# Patient Record
Sex: MALE | Race: WHITE
[De-identification: names, ages, dates, MRNs, and addresses within clinical notes are randomized per-mention and may not be internally consistent; named-entity substitution may affect disease eponyms.]

---

## 2021-05-03 ENCOUNTER — HOSPITAL ENCOUNTER (EMERGENCY)
Dept: HOSPITAL 43 - DL.ED | Age: 36
Discharge: HOME | End: 2021-05-03
Payer: COMMERCIAL

## 2021-05-03 VITALS — SYSTOLIC BLOOD PRESSURE: 121 MMHG | HEART RATE: 91 BPM | DIASTOLIC BLOOD PRESSURE: 59 MMHG

## 2021-05-03 DIAGNOSIS — S61.220A: Primary | ICD-10-CM

## 2021-05-03 DIAGNOSIS — Z23: ICD-10-CM
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
## 2021-05-03 NOTE — EDM.PDOC
ED HPI GENERAL MEDICAL PROBLEM





- General


Chief Complaint: Laceration


Stated Complaint: RIGHT POINTER FINGER


Time Seen by Provider: 21 21:20


Source of Information: Reports: Patient


History Limitations: Reports: No Limitations





- History of Present Illness


INITIAL COMMENTS - FREE TEXT/NARRATIVE: 





This 36 yo male patient reports to the ED due to a right 2nd finger laceration. 

The patient reports he was fishing and fell on the rocks. The patient reports 

his finger was "bent backwards" after the fall. The patient reports he 

straightened the finger, but could not fix the laceration. 


Onset: Today


Duration: Minutes:


Location: Reports: Upper Extremity, Right


Quality: Reports: Ache, Dull


Severity: Moderate


Improves with: Reports: None


Worsens with: Reports: None


Context: Reports: Activity


Associated Symptoms: Reports: No Other Symptoms


  ** Right Finger-Index


Pain Score (Numeric/FACES): 2





- Related Data


                                    Allergies











Allergy/AdvReac Type Severity Reaction Status Date / Time


 


No Known Allergies Allergy   Verified 21 21:30











Home Meds: 


                                    Home Meds





LORazepam 1 mg PO Q8HR PRN #20 tablet 09/18/15 [Rx]


Sertraline HCl 100 mg PO DAILY 21 [History]











Past Medical History


Other Respiratory History: Reports seasonal allergies





ED ROS GENERAL





- Review of Systems


Review Of Systems: Comprehensive ROS is negative, except as noted in HPI.





ED EXAM, SKIN/RASH


Exam: See Below


Exam Limited By: No Limitations


General Appearance: Alert, WD/WN, Mild Distress


Eye Exam: Bilateral Eye: EOMI, Normal Inspection, PERRL


Ears: Normal External Exam, Hearing Grossly Normal


Nose: Normal Inspection, No Blood


Throat/Mouth: Normal Lips, Normal Teeth, Normal Voice, No Airway Compromise


Head: Atraumatic, Normocephalic


Neck: Normal Inspection, Full Range of Motion


Respiratory/Chest: No Respiratory Distress, Lungs Clear, Normal Breath Sounds, 

No Accessory Muscle Use, Chest Non-Tender


Cardiovascular: Normal Peripheral Pulses, Regular Rate, Rhythm


 (Male) Exam: Deferred


Rectal (Males) Exam: Deferred


Extremities: Arm Pain (Right 2nd finger laceration to the palmar aspect of the 

PIP joint)


Neurological: Alert, Oriented, CN II-XII Intact, Normal Cognition, Normal Gait, 

Normal Reflexes, No Motor/Sensory Deficits


Psychiatric: Normal Affect, Normal Mood


Skin: Warm, Dry, Normal Color, No Rash


Location, Skin: Upper Extremity, Right


Characteristics: Linear


Associated features: Tenderness


Lymphatic: No Adenopathy





ED SKIN PROCEDURES





- Laceration/Wound Repair


  ** Right Digit - 2nd (Index)


Appearance: Subcutaneous


Distal NVT: Neuro & Vascular Intact


Anesthetic Type: Local


Local Anesthesia - Lidocaine (Xylocaine): 1% Plain


Local Anesthetic Volume: 3cc


Skin Prep: Chlorhexidine (Hibiciens)


Exploration/Debridement/Repair: Wound Explored, In a Bloodless Field, No Foreign

 Material Found


Closed with: Sutures


Lac/Wound length In cm: 2.0


Suture Size: 4-0


# of Sutures: 9


Suture Type: Prolene, Interrupted, Simple


Drain Placement: No


Sterile Dressing Applied: Nurse


Tetanus Status Addressed: Yes


Complications: No





Course





- Vital Signs


Last Recorded V/S: 


                                Last Vital Signs











Temp  36.1 C   21 21:25


 


Pulse  91   21 21:25


 


Resp  18   21 21:25


 


BP  121/59 L  21 21:25


 


Pulse Ox  98   21 21:25














- Orders/Labs/Meds


Orders: 


                               Active Orders 24 hr











 Category Date Time Status


 


 Vaccines to be Administered [RC] PER UNIT ROUTINE Care  21 22:10 Ordered


 


 Diphth,Pertuss(Acell),Tet Vac [Boostrix] Med  21 22:09 Once





 0.5 ml IM .ONCE ONE   











Meds: 


Medications














Discontinued Medications














Generic Name Dose Route Start Last Admin





  Trade Name Alban  PRN Reason Stop Dose Admin


 


Bacitracin  1 dose  21 21:23  21 21:53





  Bacitracin Oint 1 Gm U/D Packet  TOP  21 21:24  1 dose





  ONETIME ONE   Administration


 


Lidocaine HCl  30 ml  21 21:23  21 21:53





  Lidocaine 1% 30 Ml Sdv  INJECT  21 21:24  30 ml





  ONETIME ONE   Administration














- Radiology Interpretation


Free Text/Narrative:: 





[facility]


Final Radiology Report  Call: [phone]


assistance Online chat: [email or web address]


Name: FIOR FAITH Age: 35Years M Date: 2021


MRN: [id number] SSN: -- : [id number]


Study: CR FINGERS SECOND DIGIT RT Requesting Physician: Emerson Maharaj


Accession: [id number] Images: 3


Addl Studies:


Provided Clinical History: laceration


Contrast: Contrast Medium:


Contrast Amount: Contrast Method:


CONFIDENTIALITY STATEMENT


This report is intended only for use by the referring physician, and only in 

accordance with law. If you received this in error, call [phone].


Page 1 of 1


PROCEDURE INFORMATION:


Exam: XR Right Finger(s)


Exam date and time: 5/3/2021 9:31 PM


Age: 35 years old


Clinical indication: Injury or trauma; Other: Laceration; Right; Index finger


TECHNIQUE:


Imaging protocol: XR Right fingers.


Views: Minimum 2 views.


COMPARISON:


No relevant prior studies available.


FINDINGS:


Bones/joints: No acute fracture or dislocation.


Soft tissues: No radiopaque foreign body.


IMPRESSION:


No acute fracture or dislocation.


Thank you for allowing us to participate in the care of your patient.


Dictated and Authenticated by: Rock Kilgore DO


2021 10:05 PM Central Time ([place] & [place])





Departure





- Departure


Time of Disposition: 22:03


Disposition: Home, Self-Care 01


Condition: Fair


Clinical Impression: 


Finger laceration


Qualifiers:


 Encounter type: initial encounter Finger: index finger Damage to nail status: 

without damage Foreign body presence: with foreign body Laterality: right 

Qualified Code(s): S61.220A - Laceration with foreign body of right index finger

 without damage to nail, initial encounter








- Discharge Information


*PRESCRIPTION DRUG MONITORING PROGRAM REVIEWED*: Not Applicable


*COPY OF PRESCRIPTION DRUG MONITORING REPORT IN PATIENT FRANCIS: Not Applicable


Instructions:  Laceration Care, Adult, Easy-to-Read


Forms:  ED Department Discharge


Care Plan Goals: 


The patient was advised of the examination results during the visit. The 

laceration margins were well approximated during the visit. The patient should 

keep the area clean and dry over the next 24 hours. The patient should have the 

sutures removed in about 14 days. The patient was discharged with a script for 

Augmentin (500/125) #20 to take 1 by mouth 2 times per day for 10 days. If the 

patient has any additional symptoms or concerns, the patient should either 

return to the emergency department or follow-up with his primary care facility. 





Sepsis Event Note (ED)





- Focused Exam


Vital Signs: 


                                   Vital Signs











  Temp Pulse Resp BP Pulse Ox


 


 21 21:25  36.1 C  91  18  121/59 L  98














- My Orders


Last 24 Hours: 


My Active Orders





21 22:09


Diphth,Pertuss(Acell),Tet Vac [Boostrix]   0.5 ml IM .ONCE ONE 





21 22:10


Vaccines to be Administered [RC] PER UNIT ROUTINE 














- Assessment/Plan


Last 24 Hours: 


My Active Orders





21 22:09


Diphth,Pertuss(Acell),Tet Vac [Boostrix]   0.5 ml IM .ONCE ONE 





21 22:10


Vaccines to be Administered [RC] PER UNIT ROUTINE

## 2021-05-03 NOTE — CR
PROCEDURE INFORMATION: 

Exam: XR Right Finger(s) 

Exam date and time: 5/3/2021 9:31 PM 

Age: 35 years old 

Clinical indication: Injury or trauma; Other: Laceration; Right; Index finger 



TECHNIQUE: 

Imaging protocol: XR Right fingers. 

Views: Minimum 2 views. 



COMPARISON: 

No relevant prior studies available. 



FINDINGS: 

Bones/joints: No acute fracture or dislocation. 

Soft tissues: No radiopaque foreign body. 



IMPRESSION: 

No acute fracture or dislocation.